# Patient Record
Sex: FEMALE | Race: BLACK OR AFRICAN AMERICAN | NOT HISPANIC OR LATINO | URBAN - NONMETROPOLITAN AREA
[De-identification: names, ages, dates, MRNs, and addresses within clinical notes are randomized per-mention and may not be internally consistent; named-entity substitution may affect disease eponyms.]

---

## 2020-08-03 NOTE — PATIENT DISCUSSION
CORNEAL FB OD - REMOVED W ANESTH + QTIP WITHOUT PT DISCOMFORT. RX OFLOXACIN 1 GTT QID X 1 WEEK. RTC STAT IF SYMPTOMATIC FOR INCREASED SYMPTOMS. PT FROM [place] OF [place] AND LEAVING TOM[place].

## 2020-08-21 ENCOUNTER — IMPORTED ENCOUNTER (OUTPATIENT)
Dept: URBAN - NONMETROPOLITAN AREA CLINIC 1 | Facility: CLINIC | Age: 70
End: 2020-08-21

## 2020-08-21 PROBLEM — H52.4: Noted: 2020-08-21

## 2020-08-21 PROBLEM — H25.813: Noted: 2020-08-21

## 2020-08-21 PROCEDURE — 92015 DETERMINE REFRACTIVE STATE: CPT

## 2020-08-21 PROCEDURE — 92004 COMPRE OPH EXAM NEW PT 1/>: CPT

## 2020-08-21 NOTE — PATIENT DISCUSSION
Presbyopia OU - Discussed diagnosis in detail with patient- New glasses RX given today- Continue to monitor Combibed Cataracts OU- Discussed diagnosis in detail with patient- Discussed signs and symptoms of progression- Discussed UV protection- No treatment needed at this time - Continue to monitor

## 2021-08-23 ENCOUNTER — IMPORTED ENCOUNTER (OUTPATIENT)
Dept: URBAN - NONMETROPOLITAN AREA CLINIC 1 | Facility: CLINIC | Age: 71
End: 2021-08-23

## 2021-08-23 PROBLEM — H16.223: Noted: 2021-08-23

## 2021-08-23 PROBLEM — H52.4: Noted: 2020-08-21

## 2021-08-23 PROBLEM — H25.813: Noted: 2020-08-21

## 2021-08-23 PROCEDURE — 92014 COMPRE OPH EXAM EST PT 1/>: CPT

## 2021-08-23 PROCEDURE — 92015 DETERMINE REFRACTIVE STATE: CPT

## 2021-08-23 NOTE — PATIENT DISCUSSION
Presbyopia OU - Discussed diagnosis in detail with patient- New glasses RX given today- Continue to monitor Combibed Cataracts OU- Discussed diagnosis in detail with patient- Discussed signs and symptoms of progression- Discussed UV protection- No treatment needed at this time - Continue to monitorDES / possible Sjogren's syndrome- Discussed diagnosis in detail with patient- Discussed signs and symptoms of progression- Recommend patient drinking plenty of water and starting Omega 3’s - Recommend Refresh or Systane  throughout the day.  Coupoms given today - Consider Restasis or plugs in the future if no improvement- Patient has had blood work done but is unsure of results - Continue to monitor

## 2022-04-09 ASSESSMENT — TONOMETRY
OD_IOP_MMHG: 16
OS_IOP_MMHG: 16
OS_IOP_MMHG: 14
OD_IOP_MMHG: 14

## 2022-04-09 ASSESSMENT — VISUAL ACUITY
OD_SC: 20/50
OS_CC: 20/20-1
OD_PH: 20/50
OS_CC: 20/20
OD_CC: 20/20-1
OD_CC: 20/25-

## 2022-08-25 ENCOUNTER — ESTABLISHED PATIENT (OUTPATIENT)
Dept: URBAN - NONMETROPOLITAN AREA CLINIC 1 | Facility: CLINIC | Age: 72
End: 2022-08-25

## 2022-08-25 DIAGNOSIS — H52.4: ICD-10-CM

## 2022-08-25 PROCEDURE — 92014 COMPRE OPH EXAM EST PT 1/>: CPT

## 2022-08-25 PROCEDURE — 92015 DETERMINE REFRACTIVE STATE: CPT

## 2022-08-25 ASSESSMENT — TONOMETRY
OS_IOP_MMHG: 15
OD_IOP_MMHG: 15

## 2022-08-25 ASSESSMENT — VISUAL ACUITY: OD_SC: 20/20-2

## 2022-08-25 NOTE — PATIENT DISCUSSION
Discussed diagnosis in detail with patient. Patient has tried Ketorolac and states it gave her a severe headache and Refresh / Systane products do not seem to help. Will bring patient back for insertion of plugs. Continue to monitor.

## 2022-08-30 ENCOUNTER — CLINIC PROCEDURE ONLY (OUTPATIENT)
Dept: URBAN - NONMETROPOLITAN AREA CLINIC 1 | Facility: CLINIC | Age: 72
End: 2022-08-30

## 2022-08-30 DIAGNOSIS — H52.4: ICD-10-CM

## 2022-08-30 PROCEDURE — 92015 DETERMINE REFRACTIVE STATE: CPT

## 2022-08-30 PROCEDURE — 92014 COMPRE OPH EXAM EST PT 1/>: CPT

## 2022-08-30 ASSESSMENT — VISUAL ACUITY
OD_SC: 20/25
OS_SC: 20/30

## 2022-08-30 NOTE — PATIENT DISCUSSION
8/30/22: Plugs inserted today by Dr. Chidi Najera at slit lamp with out complications and consent given and signed. Ultra Plug 0.5mm LOT [id number] Exp date 2/08/2027 OU.

## 2022-08-30 NOTE — PROCEDURE NOTE: CLINICAL
PROCEDURE NOTE: Punctal Plugs, Silicone OU. Diagnosis: Keratoconjunctivitis Sicca, Not Specified As Sjögren's. Anesthesia: Topical. Prep: Antibiotic Drops. Prior to treatment, the risks/benefits/alternatives were discussed. The patient wished to proceed with procedure. A time out to confirm the patient, site, and procedure has been achieved. The site has been marked. Permanent silicone plugs were inserted in *. Patient tolerated procedure well. There were no complications. Post procedure instructions given. Ultra Plug 0.5mm LOT [id number] Exp date 2/08/2027 OU. Quirino Guevara

## 2022-10-06 ENCOUNTER — FOLLOW UP (OUTPATIENT)
Dept: URBAN - NONMETROPOLITAN AREA CLINIC 1 | Facility: CLINIC | Age: 72
End: 2022-10-06

## 2022-10-06 DIAGNOSIS — H16.223: ICD-10-CM

## 2022-10-06 PROCEDURE — 99213 OFFICE O/P EST LOW 20 MIN: CPT

## 2022-10-06 ASSESSMENT — TONOMETRY
OD_IOP_MMHG: 16
OS_IOP_MMHG: 16

## 2022-10-06 ASSESSMENT — VISUAL ACUITY
OS_SC: 20/30
OD_SC: 20/30-2

## 2022-10-06 NOTE — PATIENT DISCUSSION
8/30/22: Plugs inserted today by Dr. Naida Prajapati at slit lamp with out complications and consent given and signed. Ultra Plug 0.5mm LOT [id number] Exp date 2/08/2027 OU.

## 2023-04-24 ENCOUNTER — EMERGENCY VISIT (OUTPATIENT)
Dept: URBAN - NONMETROPOLITAN AREA CLINIC 1 | Facility: CLINIC | Age: 73
End: 2023-04-24

## 2023-04-24 DIAGNOSIS — H10.423: ICD-10-CM

## 2023-04-24 DIAGNOSIS — H16.223: ICD-10-CM

## 2023-04-24 PROCEDURE — 99213 OFFICE O/P EST LOW 20 MIN: CPT

## 2023-04-24 ASSESSMENT — TONOMETRY
OD_IOP_MMHG: 16
OS_IOP_MMHG: 16

## 2023-04-24 ASSESSMENT — VISUAL ACUITY
OD_CC: 20/25-2
OS_CC: 20/22-2

## 2023-08-10 ENCOUNTER — EMERGENCY VISIT (OUTPATIENT)
Dept: URBAN - NONMETROPOLITAN AREA CLINIC 1 | Facility: CLINIC | Age: 73
End: 2023-08-10

## 2023-08-10 DIAGNOSIS — H10.423: ICD-10-CM

## 2023-08-10 DIAGNOSIS — H16.223: ICD-10-CM

## 2023-08-10 PROCEDURE — 99213 OFFICE O/P EST LOW 20 MIN: CPT

## 2023-08-10 ASSESSMENT — VISUAL ACUITY
OU_SC: 20/20-1
OS_SC: 20/29-
OD_SC: 20/29-

## 2023-08-10 ASSESSMENT — TONOMETRY
OS_IOP_MMHG: 16
OD_IOP_MMHG: 15

## 2024-02-08 ENCOUNTER — ESTABLISHED PATIENT (OUTPATIENT)
Dept: URBAN - NONMETROPOLITAN AREA CLINIC 1 | Facility: CLINIC | Age: 74
End: 2024-02-08

## 2024-02-08 DIAGNOSIS — H52.4: ICD-10-CM

## 2024-02-08 PROCEDURE — 92014 COMPRE OPH EXAM EST PT 1/>: CPT

## 2024-02-08 PROCEDURE — 92015 DETERMINE REFRACTIVE STATE: CPT

## 2024-02-08 ASSESSMENT — VISUAL ACUITY
OD_BAT: 20/40-1
OS_SC: 20/25-2
OS_BAT: 20/40
OD_SC: 20/30+2

## 2024-02-08 ASSESSMENT — TONOMETRY
OD_IOP_MMHG: 15
OS_IOP_MMHG: 16